# Patient Record
Sex: FEMALE | Race: WHITE | Employment: OTHER | URBAN - METROPOLITAN AREA
[De-identification: names, ages, dates, MRNs, and addresses within clinical notes are randomized per-mention and may not be internally consistent; named-entity substitution may affect disease eponyms.]

---

## 2021-12-20 ENCOUNTER — LAB ENCOUNTER (OUTPATIENT)
Dept: LAB | Age: 71
End: 2021-12-20
Attending: NURSE PRACTITIONER
Payer: MEDICARE

## 2021-12-20 DIAGNOSIS — R41.89 ALTERATION IN COGNITION: ICD-10-CM

## 2021-12-20 DIAGNOSIS — F32.5 MAJOR DEPRESSIVE DISORDER WITH SINGLE EPISODE, IN REMISSION (HCC): ICD-10-CM

## 2021-12-20 PROCEDURE — 84439 ASSAY OF FREE THYROXINE: CPT

## 2021-12-20 PROCEDURE — 84443 ASSAY THYROID STIM HORMONE: CPT

## 2021-12-20 PROCEDURE — 82746 ASSAY OF FOLIC ACID SERUM: CPT

## 2021-12-20 PROCEDURE — 82607 VITAMIN B-12: CPT

## 2021-12-20 PROCEDURE — 80053 COMPREHEN METABOLIC PANEL: CPT

## 2021-12-20 PROCEDURE — 85025 COMPLETE CBC W/AUTO DIFF WBC: CPT

## 2021-12-20 PROCEDURE — 82306 VITAMIN D 25 HYDROXY: CPT

## 2022-01-03 PROBLEM — F32.5 MAJOR DEPRESSIVE DISORDER WITH SINGLE EPISODE, IN REMISSION (HCC): Status: ACTIVE | Noted: 2022-01-03

## 2023-01-13 ENCOUNTER — OFFICE VISIT (OUTPATIENT)
Dept: WOUND CARE | Facility: HOSPITAL | Age: 73
End: 2023-01-13
Attending: NURSE PRACTITIONER
Payer: MEDICARE

## 2023-01-13 VITALS
TEMPERATURE: 97 F | HEART RATE: 78 BPM | SYSTOLIC BLOOD PRESSURE: 192 MMHG | DIASTOLIC BLOOD PRESSURE: 92 MMHG | WEIGHT: 155 LBS | HEIGHT: 68 IN | BODY MASS INDEX: 23.49 KG/M2

## 2023-01-13 DIAGNOSIS — I87.331 CHRONIC VENOUS HYPERTENSION (IDIOPATHIC) WITH ULCER AND INFLAMMATION OF RIGHT LOWER EXTREMITY (CODE) (HCC): Primary | ICD-10-CM

## 2023-01-13 PROCEDURE — 99205 OFFICE O/P NEW HI 60 MIN: CPT

## 2023-01-13 NOTE — PROGRESS NOTES
Weekly Wound Education Note    Teaching Provided To: Patient  Training Topics: Cleasing and general instructions;Nutrition;Skin care  Training Method: Explain/Verbal  Training Response: Reinforcement needed        Notes: started on medihoney gel dressing, Spandagrip E to right lower leg

## 2023-01-13 NOTE — PATIENT INSTRUCTIONS
PATIENT INSTRUCTIONS      FOR VILMA Vickers 1950    DATE OF SERVICE: 2023      Apply Medihoney gel to the wound base    Cover with border gauze dressing      Wear spandagrip or your compression stocking    You may shower but no soaking wound in tub    Elevate legs whenever possible    Please follow-up with the wound care doctor in [place] when you return there    If she have any problems feel free to contact me. Follow up with Dr. Steve Charles 1-2 weeks with a wound care doctor in [place].

## 2023-06-16 ENCOUNTER — APPOINTMENT (OUTPATIENT)
Dept: WOUND CARE | Facility: HOSPITAL | Age: 73
End: 2023-06-16
Attending: FAMILY MEDICINE
Payer: MEDICARE